# Patient Record
Sex: MALE | Race: WHITE | NOT HISPANIC OR LATINO | Employment: FULL TIME | URBAN - METROPOLITAN AREA
[De-identification: names, ages, dates, MRNs, and addresses within clinical notes are randomized per-mention and may not be internally consistent; named-entity substitution may affect disease eponyms.]

---

## 2018-02-07 ENCOUNTER — OFFICE VISIT (OUTPATIENT)
Dept: INTERNAL MEDICINE | Facility: CLINIC | Age: 33
End: 2018-02-07

## 2018-02-07 VITALS
SYSTOLIC BLOOD PRESSURE: 158 MMHG | BODY MASS INDEX: 26.63 KG/M2 | TEMPERATURE: 98.3 F | OXYGEN SATURATION: 98 % | WEIGHT: 196.6 LBS | HEART RATE: 69 BPM | HEIGHT: 72 IN | DIASTOLIC BLOOD PRESSURE: 70 MMHG

## 2018-02-07 DIAGNOSIS — J01.10 ACUTE FRONTAL SINUSITIS, RECURRENCE NOT SPECIFIED: ICD-10-CM

## 2018-02-07 DIAGNOSIS — R31.0 GROSS HEMATURIA: Primary | ICD-10-CM

## 2018-02-07 LAB
ALBUMIN SERPL-MCNC: 4.4 G/DL (ref 3.5–5.2)
ALBUMIN/GLOB SERPL: 2.3 G/DL
ALP SERPL-CCNC: 82 U/L (ref 39–117)
ALT SERPL-CCNC: 17 U/L (ref 1–41)
AST SERPL-CCNC: 13 U/L (ref 1–40)
BASOPHILS # BLD AUTO: 0.06 10*3/MM3 (ref 0–0.2)
BASOPHILS NFR BLD AUTO: 0.5 % (ref 0–1.5)
BILIRUB BLD-MCNC: NEGATIVE MG/DL
BILIRUB SERPL-MCNC: 0.3 MG/DL (ref 0.1–1.2)
BUN SERPL-MCNC: 11 MG/DL (ref 6–20)
BUN/CREAT SERPL: 10.7 (ref 7–25)
CALCIUM SERPL-MCNC: 9.1 MG/DL (ref 8.6–10.5)
CHLORIDE SERPL-SCNC: 102 MMOL/L (ref 98–107)
CLARITY, POC: CLEAR
CO2 SERPL-SCNC: 26.8 MMOL/L (ref 22–29)
COLOR UR: YELLOW
CREAT SERPL-MCNC: 1.03 MG/DL (ref 0.76–1.27)
EOSINOPHIL # BLD AUTO: 0.95 10*3/MM3 (ref 0–0.7)
EOSINOPHIL NFR BLD AUTO: 7.2 % (ref 0.3–6.2)
ERYTHROCYTE [DISTWIDTH] IN BLOOD BY AUTOMATED COUNT: 13.2 % (ref 11.5–14.5)
GFR SERPLBLD CREATININE-BSD FMLA CKD-EPI: 100 ML/MIN/1.73
GFR SERPLBLD CREATININE-BSD FMLA CKD-EPI: 83 ML/MIN/1.73
GLOBULIN SER CALC-MCNC: 1.9 GM/DL
GLUCOSE SERPL-MCNC: 98 MG/DL (ref 65–99)
GLUCOSE UR STRIP-MCNC: NEGATIVE MG/DL
HCT VFR BLD AUTO: 44.7 % (ref 40.4–52.2)
HGB BLD-MCNC: 15 G/DL (ref 13.7–17.6)
IMM GRANULOCYTES # BLD: 0.04 10*3/MM3 (ref 0–0.03)
IMM GRANULOCYTES NFR BLD: 0.3 % (ref 0–0.5)
KETONES UR QL: NEGATIVE
LEUKOCYTE EST, POC: NEGATIVE
LYMPHOCYTES # BLD AUTO: 2.58 10*3/MM3 (ref 0.9–4.8)
LYMPHOCYTES NFR BLD AUTO: 19.6 % (ref 19.6–45.3)
MCH RBC QN AUTO: 30.4 PG (ref 27–32.7)
MCHC RBC AUTO-ENTMCNC: 33.6 G/DL (ref 32.6–36.4)
MCV RBC AUTO: 90.5 FL (ref 79.8–96.2)
MONOCYTES # BLD AUTO: 1.04 10*3/MM3 (ref 0.2–1.2)
MONOCYTES NFR BLD AUTO: 7.9 % (ref 5–12)
NEUTROPHILS # BLD AUTO: 8.47 10*3/MM3 (ref 1.9–8.1)
NEUTROPHILS NFR BLD AUTO: 64.5 % (ref 42.7–76)
NITRITE UR-MCNC: NEGATIVE MG/ML
PH UR: 6 [PH] (ref 5–8)
PLATELET # BLD AUTO: 265 10*3/MM3 (ref 140–500)
POTASSIUM SERPL-SCNC: 4.2 MMOL/L (ref 3.5–5.2)
PROT SERPL-MCNC: 6.3 G/DL (ref 6–8.5)
PROT UR STRIP-MCNC: NEGATIVE MG/DL
RBC # BLD AUTO: 4.94 10*6/MM3 (ref 4.6–6)
RBC # UR STRIP: NEGATIVE /UL
SODIUM SERPL-SCNC: 142 MMOL/L (ref 136–145)
SP GR UR: 1.02 (ref 1–1.03)
UROBILINOGEN UR QL: NORMAL
WBC # BLD AUTO: 13.14 10*3/MM3 (ref 4.5–10.7)

## 2018-02-07 PROCEDURE — 99214 OFFICE O/P EST MOD 30 MIN: CPT | Performed by: FAMILY MEDICINE

## 2018-02-07 PROCEDURE — 81003 URINALYSIS AUTO W/O SCOPE: CPT | Performed by: FAMILY MEDICINE

## 2018-02-07 RX ORDER — AMOXICILLIN 500 MG/1
500 CAPSULE ORAL 3 TIMES DAILY
Qty: 21 CAPSULE | Refills: 0 | Status: SHIPPED | OUTPATIENT
Start: 2018-02-07 | End: 2018-07-27

## 2018-02-07 NOTE — PROGRESS NOTES
Uriel Jarvis is a 32 y.o. male.      Assessment/Plan   Problem List Items Addressed This Visit        Respiratory    Acute frontal sinusitis       Genitourinary    Gross hematuria - Primary    Relevant Orders    Comprehensive Metabolic Panel    CBC & Differential    POCT urinalysis dipstick, automated (Completed)           No blood in urinalysis stop creatine,  Monitor for recurrence of blood in urine or semen  Follow up if blood recurs or has lump in testicle    Chief Complaint   Patient presents with   • Blood in Urine (clot)     once a few days ago   • Nasal Congestion     off an on since the holidays   • watery eyes   • Sore Throat     Social History   Substance Use Topics   • Smoking status: Current Every Day Smoker     Packs/day: 1.50     Types: Cigarettes   • Smokeless tobacco: Never Used   • Alcohol use Yes      Comment: 5 per month       History of Present Illness   Patient comes in because he had episode of blood in his urine one time he is not having pelvic pain has been no fever there's no increased frequency of urine has not had history of recurrent urinary urinary tract infections or STDs he realized that this happened after he had had ejaculation no subsequent episodes.  He is not feeling lumps in his testicles  He also is concerned because he's had upper respiratory symptoms for the last month she is to get him symptoms consistent with a seborrheic winter he has not had any specific fever mostly head congestion and some facial tenderness no medications used  The following portions of the patient's history were reviewed and updated as appropriate:PMHroutine: Social history , Past Medical History, Allergies, Current Medications, Active Problem List, Family History and Health Maintenance    Review of Systems   Constitutional: Negative.    HENT: Negative.    Respiratory: Negative.    Cardiovascular: Negative.    Genitourinary: Positive for hematuria. Negative for decreased urine volume, difficulty  "urinating, discharge, dysuria, enuresis, flank pain, frequency, genital sores, penile swelling, scrotal swelling, testicular pain and urgency.       Objective   Vitals:    02/07/18 1046   BP: 158/70   BP Location: Right arm   Patient Position: Sitting   Cuff Size: Large Adult   Pulse: 69   Temp: 98.3 °F (36.8 °C)   TempSrc: Oral   SpO2: 98%   Weight: 89.2 kg (196 lb 9.6 oz)   Height: 182.9 cm (72\")     Body mass index is 26.66 kg/(m^2).  Physical Exam   Constitutional: He is oriented to person, place, and time. He appears well-developed and well-nourished.  Non-toxic appearance. No distress.   HENT:   Head: Normocephalic and atraumatic. Hair is normal.   Right Ear: External ear normal. No drainage, swelling or tenderness. Tympanic membrane is retracted.   Left Ear: External ear normal. No drainage, swelling or tenderness. Tympanic membrane is retracted.   Nose: Mucosal edema present. No epistaxis. Right sinus exhibits maxillary sinus tenderness and frontal sinus tenderness. Left sinus exhibits maxillary sinus tenderness and frontal sinus tenderness.   Mouth/Throat: Uvula is midline and mucous membranes are normal. No oral lesions. No uvula swelling. Posterior oropharyngeal erythema present. No oropharyngeal exudate.   Eyes: Conjunctivae and EOM are normal. Pupils are equal, round, and reactive to light. Right eye exhibits no discharge. Left eye exhibits no discharge. No scleral icterus.   Neck: Normal range of motion. Neck supple.   Cardiovascular: Normal rate, regular rhythm and normal heart sounds.  Exam reveals no gallop.    No murmur heard.  Pulmonary/Chest: Effort normal and breath sounds normal. No stridor. No respiratory distress. He has no wheezes. He has no rales. He exhibits no tenderness.   Abdominal: Soft. There is no tenderness.   Lymphadenopathy:     He has cervical adenopathy.   Neurological: He is alert and oriented to person, place, and time. He exhibits normal muscle tone.   Skin: Skin is warm and " dry. No rash noted. He is not diaphoretic.   Psychiatric: He has a normal mood and affect. His behavior is normal. Judgment and thought content normal.   Nursing note and vitals reviewed.    Reviewed Data:  Office Visit on 02/07/2018   Component Date Value Ref Range Status   • Color 02/07/2018 Yellow  Yellow, Straw, Dark Yellow,  Final   • Clarity, UA 02/07/2018 Clear  Clear Final   • Glucose, UA 02/07/2018 Negative  Negative, 1000 mg/dL (3+) mg/dL Final   • Bilirubin 02/07/2018 Negative  Negative Final   • Ketones, UA 02/07/2018 Negative  Negative Final   • Specific Gravity  02/07/2018 1.025  1.005 - 1.030 Final   • Blood, UA 02/07/2018 Negative  Negative Final   • pH, Urine 02/07/2018 6.0  5.0 - 8.0 Final   • Protein, POC 02/07/2018 Negative  Negative mg/dL Final   • Urobilinogen, UA 02/07/2018 Normal  Normal Final   • Leukocytes 02/07/2018 Negative  Negative Final   • Nitrite, UA 02/07/2018 Negative  Negative Final

## 2018-02-12 ENCOUNTER — TELEPHONE (OUTPATIENT)
Dept: INTERNAL MEDICINE | Facility: CLINIC | Age: 33
End: 2018-02-12

## 2018-02-12 NOTE — TELEPHONE ENCOUNTER
----- Message from Jose Carlos Sewell MD sent at 2/9/2018  3:38 PM EST -----  His CBC doesn't show any specific anemia however he has elevated white blood cell count without any obvious infection recommend repeating CBC in one month

## 2018-07-27 ENCOUNTER — OFFICE VISIT (OUTPATIENT)
Dept: INTERNAL MEDICINE | Facility: CLINIC | Age: 33
End: 2018-07-27

## 2018-07-27 VITALS
SYSTOLIC BLOOD PRESSURE: 144 MMHG | WEIGHT: 177.7 LBS | HEART RATE: 71 BPM | DIASTOLIC BLOOD PRESSURE: 84 MMHG | OXYGEN SATURATION: 98 % | TEMPERATURE: 98.4 F | HEIGHT: 72 IN | BODY MASS INDEX: 24.07 KG/M2

## 2018-07-27 DIAGNOSIS — F52.4 PREMATURE EJACULATION: ICD-10-CM

## 2018-07-27 DIAGNOSIS — D72.829 LEUKOCYTOSIS, UNSPECIFIED TYPE: ICD-10-CM

## 2018-07-27 DIAGNOSIS — R63.4 WEIGHT LOSS: Primary | ICD-10-CM

## 2018-07-27 PROBLEM — R31.0 GROSS HEMATURIA: Status: RESOLVED | Noted: 2018-02-07 | Resolved: 2018-07-27

## 2018-07-27 PROCEDURE — 99214 OFFICE O/P EST MOD 30 MIN: CPT | Performed by: FAMILY MEDICINE

## 2018-07-27 RX ORDER — PAROXETINE HYDROCHLORIDE 20 MG/1
20 TABLET, FILM COATED ORAL EVERY MORNING
Qty: 30 TABLET | Refills: 6 | Status: SHIPPED | OUTPATIENT
Start: 2018-07-27 | End: 2019-04-16 | Stop reason: SDUPTHER

## 2018-07-27 NOTE — PROGRESS NOTES
Uriel Jarvis is a 33 y.o. male.      Assessment/Plan   Problem List Items Addressed This Visit        Immune and Lymphatic    Leukocytosis       Genitourinary    Premature ejaculation       Other    Weight loss - Primary    Relevant Orders    TSH    T4, Free    CBC & Differential    Comprehensive Metabolic Panel    Hemoglobin A1c           Follow up results of blood work and benefit of Paxil for premature ejaculation otherwise as needed or in one month      Chief Complaint   Patient presents with   • premature ejactulation   • Weight Loss     Social History   Substance Use Topics   • Smoking status: Current Every Day Smoker     Packs/day: 1.50     Types: Electronic Cigarette, Cigarettes   • Smokeless tobacco: Never Used   • Alcohol use Yes      Comment: 5 per month       History of Present Illness   Patient appointment for chronic problems of premature ejaculation he has close to immediate check relation with penetration is been long-standing is on Zoloft the past worked well for him and he just stopped taking it because staff physician retired he has no more blood in his urine but is concerned because developed some weight loss over the last 6 months, 19 pounds is not specifically losing weight although he has been eaten today and it's 3:00 in the afternoon he works as a  country [employer] there is no fever no chills no lumps no specific change in his diet is more active and doesn't really feel hungry much.  History of leukocytosis without follow-up in one performed today  The following portions of the patient's history were reviewed and updated as appropriate:PMHroutine: Social history , Past Medical History, Surgical history , Allergies, Current Medications, Active Problem List and Health Maintenance    Review of Systems   Constitutional: Positive for unexpected weight change.   HENT: Negative.    Respiratory: Negative.    Cardiovascular: Negative.    Gastrointestinal: Negative.    Genitourinary:  "Negative.    Musculoskeletal: Negative.    Skin: Negative.    Hematological: Negative.    Psychiatric/Behavioral: Negative.        Objective   Vitals:    07/27/18 1413   BP: 144/84   BP Location: Left arm   Patient Position: Sitting   Cuff Size: Large Adult   Pulse: 71   Temp: 98.4 °F (36.9 °C)   TempSrc: Oral   SpO2: 98%   Weight: 80.6 kg (177 lb 11.2 oz)   Height: 182.9 cm (72\")     Body mass index is 24.1 kg/m².  Physical Exam   Constitutional: He is oriented to person, place, and time. He appears well-developed and well-nourished. No distress.   HENT:   Head: Normocephalic and atraumatic.   Eyes: Pupils are equal, round, and reactive to light. Conjunctivae and EOM are normal. Right eye exhibits no discharge. Left eye exhibits no discharge. No scleral icterus.   Neck: Normal range of motion. Neck supple. No tracheal deviation present. No thyromegaly present.   Cardiovascular: Normal rate, regular rhythm, normal heart sounds, intact distal pulses and normal pulses.  Exam reveals no gallop.    No murmur heard.  Pulmonary/Chest: Effort normal and breath sounds normal. No respiratory distress. He has no wheezes. He has no rales.   Abdominal: Soft. Bowel sounds are normal. He exhibits no distension. There is no tenderness. There is no rebound and no guarding. A hernia is present.   umbilical   Musculoskeletal: Normal range of motion.   Lymphadenopathy:        Head (right side): No tonsillar, no preauricular and no posterior auricular adenopathy present.        Head (left side): No tonsillar, no preauricular and no posterior auricular adenopathy present.     He has no cervical adenopathy.     He has no axillary adenopathy.        Right: No inguinal and no supraclavicular adenopathy present.        Left: No inguinal and no supraclavicular adenopathy present.   Neurological: He is alert and oriented to person, place, and time. He exhibits normal muscle tone. Coordination normal.   Skin: Skin is warm. No rash noted. No " erythema. No pallor.   Psychiatric: He has a normal mood and affect. His behavior is normal. Judgment and thought content normal.   Nursing note and vitals reviewed.    Reviewed Data:  No visits with results within 1 Month(s) from this visit.   Latest known visit with results is:   Office Visit on 02/07/2018   Component Date Value Ref Range Status   • Glucose 02/07/2018 98  65 - 99 mg/dL Final   • BUN 02/07/2018 11  6 - 20 mg/dL Final   • Creatinine 02/07/2018 1.03  0.76 - 1.27 mg/dL Final   • eGFR Non  Am 02/07/2018 83  >60 mL/min/1.73 Final   • eGFR African Am 02/07/2018 100  >60 mL/min/1.73 Final   • BUN/Creatinine Ratio 02/07/2018 10.7  7.0 - 25.0 Final   • Sodium 02/07/2018 142  136 - 145 mmol/L Final   • Potassium 02/07/2018 4.2  3.5 - 5.2 mmol/L Final   • Chloride 02/07/2018 102  98 - 107 mmol/L Final   • Total CO2 02/07/2018 26.8  22.0 - 29.0 mmol/L Final   • Calcium 02/07/2018 9.1  8.6 - 10.5 mg/dL Final   • Total Protein 02/07/2018 6.3  6.0 - 8.5 g/dL Final   • Albumin 02/07/2018 4.40  3.50 - 5.20 g/dL Final   • Globulin 02/07/2018 1.9  gm/dL Final   • A/G Ratio 02/07/2018 2.3  g/dL Final   • Total Bilirubin 02/07/2018 0.3  0.1 - 1.2 mg/dL Final   • Alkaline Phosphatase 02/07/2018 82  39 - 117 U/L Final   • AST (SGOT) 02/07/2018 13  1 - 40 U/L Final   • ALT (SGPT) 02/07/2018 17  1 - 41 U/L Final   • WBC 02/07/2018 13.14* 4.50 - 10.70 10*3/mm3 Final   • RBC 02/07/2018 4.94  4.60 - 6.00 10*6/mm3 Final   • Hemoglobin 02/07/2018 15.0  13.7 - 17.6 g/dL Final   • Hematocrit 02/07/2018 44.7  40.4 - 52.2 % Final   • MCV 02/07/2018 90.5  79.8 - 96.2 fL Final   • MCH 02/07/2018 30.4  27.0 - 32.7 pg Final   • MCHC 02/07/2018 33.6  32.6 - 36.4 g/dL Final   • RDW 02/07/2018 13.2  11.5 - 14.5 % Final   • Platelets 02/07/2018 265  140 - 500 10*3/mm3 Final   • Neutrophil Rel % 02/07/2018 64.5  42.7 - 76.0 % Final   • Lymphocyte Rel % 02/07/2018 19.6  19.6 - 45.3 % Final   • Monocyte Rel % 02/07/2018 7.9  5.0 - 12.0  % Final   • Eosinophil Rel % 02/07/2018 7.2* 0.3 - 6.2 % Final   • Basophil Rel % 02/07/2018 0.5  0.0 - 1.5 % Final   • Neutrophils Absolute 02/07/2018 8.47* 1.90 - 8.10 10*3/mm3 Final   • Lymphocytes Absolute 02/07/2018 2.58  0.90 - 4.80 10*3/mm3 Final   • Monocytes Absolute 02/07/2018 1.04  0.20 - 1.20 10*3/mm3 Final   • Eosinophils Absolute 02/07/2018 0.95* 0.00 - 0.70 10*3/mm3 Final   • Basophils Absolute 02/07/2018 0.06  0.00 - 0.20 10*3/mm3 Final   • Immature Granulocyte Rel % 02/07/2018 0.3  0.0 - 0.5 % Final   • Immature Grans Absolute 02/07/2018 0.04* 0.00 - 0.03 10*3/mm3 Final   • Color 02/07/2018 Yellow  Yellow, Straw, Dark Yellow,  Final   • Clarity, UA 02/07/2018 Clear  Clear Final   • Glucose, UA 02/07/2018 Negative  Negative, 1000 mg/dL (3+) mg/dL Final   • Bilirubin 02/07/2018 Negative  Negative Final   • Ketones, UA 02/07/2018 Negative  Negative Final   • Specific Gravity  02/07/2018 1.025  1.005 - 1.030 Final   • Blood, UA 02/07/2018 Negative  Negative Final   • pH, Urine 02/07/2018 6.0  5.0 - 8.0 Final   • Protein, POC 02/07/2018 Negative  Negative mg/dL Final   • Urobilinogen, UA 02/07/2018 Normal  Normal Final   • Leukocytes 02/07/2018 Negative  Negative Final   • Nitrite, UA 02/07/2018 Negative  Negative Final

## 2018-07-28 LAB
ALBUMIN SERPL-MCNC: 4.9 G/DL (ref 3.5–5.2)
ALBUMIN/GLOB SERPL: 2.5 G/DL
ALP SERPL-CCNC: 73 U/L (ref 39–117)
ALT SERPL-CCNC: 22 U/L (ref 1–41)
AST SERPL-CCNC: 15 U/L (ref 1–40)
BASOPHILS # BLD AUTO: 0.05 10*3/MM3 (ref 0–0.2)
BASOPHILS NFR BLD AUTO: 0.7 % (ref 0–1.5)
BILIRUB SERPL-MCNC: 0.5 MG/DL (ref 0.1–1.2)
BUN SERPL-MCNC: 14 MG/DL (ref 6–20)
BUN/CREAT SERPL: 11.8 (ref 7–25)
CALCIUM SERPL-MCNC: 8.9 MG/DL (ref 8.6–10.5)
CHLORIDE SERPL-SCNC: 101 MMOL/L (ref 98–107)
CO2 SERPL-SCNC: 24.8 MMOL/L (ref 22–29)
CREAT SERPL-MCNC: 1.19 MG/DL (ref 0.76–1.27)
EOSINOPHIL # BLD AUTO: 0.8 10*3/MM3 (ref 0–0.7)
EOSINOPHIL NFR BLD AUTO: 11.5 % (ref 0.3–6.2)
ERYTHROCYTE [DISTWIDTH] IN BLOOD BY AUTOMATED COUNT: 13.6 % (ref 11.5–14.5)
GLOBULIN SER CALC-MCNC: 2 GM/DL
GLUCOSE SERPL-MCNC: 98 MG/DL (ref 65–99)
HBA1C MFR BLD: 5.23 % (ref 4.8–5.6)
HCT VFR BLD AUTO: 45.6 % (ref 40.4–52.2)
HGB BLD-MCNC: 15 G/DL (ref 13.7–17.6)
IMM GRANULOCYTES # BLD: 0.01 10*3/MM3 (ref 0–0.03)
IMM GRANULOCYTES NFR BLD: 0.1 % (ref 0–0.5)
LYMPHOCYTES # BLD AUTO: 1.89 10*3/MM3 (ref 0.9–4.8)
LYMPHOCYTES NFR BLD AUTO: 27.2 % (ref 19.6–45.3)
MCH RBC QN AUTO: 30.2 PG (ref 27–32.7)
MCHC RBC AUTO-ENTMCNC: 32.9 G/DL (ref 32.6–36.4)
MCV RBC AUTO: 91.9 FL (ref 79.8–96.2)
MONOCYTES # BLD AUTO: 0.61 10*3/MM3 (ref 0.2–1.2)
MONOCYTES NFR BLD AUTO: 8.8 % (ref 5–12)
NEUTROPHILS # BLD AUTO: 3.59 10*3/MM3 (ref 1.9–8.1)
NEUTROPHILS NFR BLD AUTO: 51.8 % (ref 42.7–76)
PLATELET # BLD AUTO: 266 10*3/MM3 (ref 140–500)
POTASSIUM SERPL-SCNC: 4.3 MMOL/L (ref 3.5–5.2)
PROT SERPL-MCNC: 6.9 G/DL (ref 6–8.5)
RBC # BLD AUTO: 4.96 10*6/MM3 (ref 4.6–6)
SODIUM SERPL-SCNC: 141 MMOL/L (ref 136–145)
T4 FREE SERPL-MCNC: 1.41 NG/DL (ref 0.93–1.7)
TSH SERPL DL<=0.005 MIU/L-ACNC: 1.29 MIU/ML (ref 0.27–4.2)
WBC # BLD AUTO: 6.94 10*3/MM3 (ref 4.5–10.7)

## 2018-07-30 ENCOUNTER — TELEPHONE (OUTPATIENT)
Dept: INTERNAL MEDICINE | Facility: CLINIC | Age: 33
End: 2018-07-30

## 2019-04-15 RX ORDER — PAROXETINE HYDROCHLORIDE 20 MG/1
TABLET, FILM COATED ORAL
Qty: 30 TABLET | Refills: 5 | OUTPATIENT
Start: 2019-04-15

## 2019-04-16 ENCOUNTER — TELEPHONE (OUTPATIENT)
Dept: INTERNAL MEDICINE | Facility: CLINIC | Age: 34
End: 2019-04-16

## 2019-04-16 RX ORDER — PAROXETINE HYDROCHLORIDE 20 MG/1
20 TABLET, FILM COATED ORAL EVERY MORNING
Qty: 30 TABLET | Refills: 0 | Status: SHIPPED | OUTPATIENT
Start: 2019-04-16 | End: 2019-05-15 | Stop reason: SDUPTHER

## 2019-04-16 NOTE — TELEPHONE ENCOUNTER
Patient called stating that his citalopram refill was denied.  H was to return for an appointment 1 month from his last visit which was on 7/27/18.  Patient has an appointment scheduled for 5/2/19 and wanted to know if he could have this refilled until seen.  Please advise.

## 2019-05-15 ENCOUNTER — OFFICE VISIT (OUTPATIENT)
Dept: INTERNAL MEDICINE | Facility: CLINIC | Age: 34
End: 2019-05-15

## 2019-05-15 VITALS
TEMPERATURE: 98.3 F | SYSTOLIC BLOOD PRESSURE: 134 MMHG | OXYGEN SATURATION: 98 % | BODY MASS INDEX: 25.74 KG/M2 | HEART RATE: 73 BPM | DIASTOLIC BLOOD PRESSURE: 76 MMHG | WEIGHT: 189.8 LBS

## 2019-05-15 DIAGNOSIS — F52.4 PREMATURE EJACULATION: Primary | ICD-10-CM

## 2019-05-15 DIAGNOSIS — Z00.00 HEALTHCARE MAINTENANCE: ICD-10-CM

## 2019-05-15 PROBLEM — R63.4 WEIGHT LOSS: Status: RESOLVED | Noted: 2018-07-27 | Resolved: 2019-05-15

## 2019-05-15 PROCEDURE — 99213 OFFICE O/P EST LOW 20 MIN: CPT | Performed by: FAMILY MEDICINE

## 2019-05-15 RX ORDER — AMOXICILLIN 500 MG/1
CAPSULE ORAL
COMMUNITY
Start: 2019-04-02 | End: 2019-05-15

## 2019-05-15 RX ORDER — PAROXETINE HYDROCHLORIDE 20 MG/1
20 TABLET, FILM COATED ORAL EVERY MORNING
Qty: 30 TABLET | Refills: 11 | Status: SHIPPED | OUTPATIENT
Start: 2019-05-15 | End: 2020-05-18 | Stop reason: SDUPTHER

## 2019-05-15 NOTE — PROGRESS NOTES
Uriel Jarvis is a 34 y.o. male.      Assessment/Plan   Problem List Items Addressed This Visit        Genitourinary    Premature ejaculation - Primary       Other    Healthcare maintenance    Relevant Orders    Lipid Panel           Follow-up results of lipid panel for cardiac risk reduction continue Paxil for official treatment of premature ejaculation  Return in about 1 year (around 5/15/2020) for Next scheduled follow up, Recheck.      Chief Complaint   Patient presents with   • follow up to premature ejaculation   • sinus congestion     Social History     Tobacco Use   • Smoking status: Current Every Day Smoker     Packs/day: 1.50     Types: Electronic Cigarette, Cigarettes   • Smokeless tobacco: Never Used   Substance Use Topics   • Alcohol use: Yes     Comment: 5 per month   • Drug use: Not on file       History of Present Illness   Patient follows up for chronic problems of premature ejaculation doing well with fluoxetine he like to continue the same he has no chest pain shortness of breath or increased fatigue's father's recent diagnosis with coronary disease and has had bypass surgery  The following portions of the patient's history were reviewed and updated as appropriate:PMHroutine: Social history , Allergies, Current Medications, Active Problem List and Health Maintenance    Review of Systems   Constitutional: Negative.    HENT: Negative.    Respiratory: Negative.    Genitourinary: Negative.    Psychiatric/Behavioral: Negative.        Objective   Vitals:    05/15/19 1036   BP: 134/76   BP Location: Left arm   Patient Position: Sitting   Cuff Size: Large Adult   Pulse: 73   Temp: 98.3 °F (36.8 °C)   TempSrc: Oral   SpO2: 98%   Weight: 86.1 kg (189 lb 12.8 oz)     Body mass index is 25.74 kg/m².  Physical Exam   Constitutional: He is oriented to person, place, and time. He appears well-developed and well-nourished.   Cardiovascular: Normal rate.   Musculoskeletal: Normal range of motion.   Neurological: He  is alert and oriented to person, place, and time.   Psychiatric: He has a normal mood and affect. His behavior is normal. Judgment and thought content normal.   Nursing note and vitals reviewed.    Reviewed Data:  No visits with results within 1 Month(s) from this visit.   Latest known visit with results is:   Office Visit on 07/27/2018   Component Date Value Ref Range Status   • TSH 07/27/2018 1.290  0.270 - 4.200 mIU/mL Final   • Free T4 07/27/2018 1.41  0.93 - 1.70 ng/dL Final   • WBC 07/27/2018 6.94  4.50 - 10.70 10*3/mm3 Final   • RBC 07/27/2018 4.96  4.60 - 6.00 10*6/mm3 Final   • Hemoglobin 07/27/2018 15.0  13.7 - 17.6 g/dL Final   • Hematocrit 07/27/2018 45.6  40.4 - 52.2 % Final   • MCV 07/27/2018 91.9  79.8 - 96.2 fL Final   • MCH 07/27/2018 30.2  27.0 - 32.7 pg Final   • MCHC 07/27/2018 32.9  32.6 - 36.4 g/dL Final   • RDW 07/27/2018 13.6  11.5 - 14.5 % Final   • Platelets 07/27/2018 266  140 - 500 10*3/mm3 Final   • Neutrophil Rel % 07/27/2018 51.8  42.7 - 76.0 % Final   • Lymphocyte Rel % 07/27/2018 27.2  19.6 - 45.3 % Final   • Monocyte Rel % 07/27/2018 8.8  5.0 - 12.0 % Final   • Eosinophil Rel % 07/27/2018 11.5* 0.3 - 6.2 % Final   • Basophil Rel % 07/27/2018 0.7  0.0 - 1.5 % Final   • Neutrophils Absolute 07/27/2018 3.59  1.90 - 8.10 10*3/mm3 Final   • Lymphocytes Absolute 07/27/2018 1.89  0.90 - 4.80 10*3/mm3 Final   • Monocytes Absolute 07/27/2018 0.61  0.20 - 1.20 10*3/mm3 Final   • Eosinophils Absolute 07/27/2018 0.80* 0.00 - 0.70 10*3/mm3 Final   • Basophils Absolute 07/27/2018 0.05  0.00 - 0.20 10*3/mm3 Final   • Immature Granulocyte Rel % 07/27/2018 0.1  0.0 - 0.5 % Final   • Immature Grans Absolute 07/27/2018 0.01  0.00 - 0.03 10*3/mm3 Final   • Glucose 07/27/2018 98  65 - 99 mg/dL Final   • BUN 07/27/2018 14  6 - 20 mg/dL Final   • Creatinine 07/27/2018 1.19  0.76 - 1.27 mg/dL Final   • eGFR Non  Am 07/27/2018 70  >60 mL/min/1.73 Final   • eGFR African Am 07/27/2018 84  >60  mL/min/1.73 Final   • BUN/Creatinine Ratio 07/27/2018 11.8  7.0 - 25.0 Final   • Sodium 07/27/2018 141  136 - 145 mmol/L Final   • Potassium 07/27/2018 4.3  3.5 - 5.2 mmol/L Final   • Chloride 07/27/2018 101  98 - 107 mmol/L Final   • Total CO2 07/27/2018 24.8  22.0 - 29.0 mmol/L Final   • Calcium 07/27/2018 8.9  8.6 - 10.5 mg/dL Final   • Total Protein 07/27/2018 6.9  6.0 - 8.5 g/dL Final   • Albumin 07/27/2018 4.90  3.50 - 5.20 g/dL Final   • Globulin 07/27/2018 2.0  gm/dL Final   • A/G Ratio 07/27/2018 2.5  g/dL Final   • Total Bilirubin 07/27/2018 0.5  0.1 - 1.2 mg/dL Final   • Alkaline Phosphatase 07/27/2018 73  39 - 117 U/L Final   • AST (SGOT) 07/27/2018 15  1 - 40 U/L Final   • ALT (SGPT) 07/27/2018 22  1 - 41 U/L Final   • Hemoglobin A1C 07/27/2018 5.23  4.80 - 5.60 % Final    Comment: Hemoglobin A1C Ranges:  Increased Risk for Diabetes  5.7% to 6.4%  Diabetes                     >= 6.5%  Diabetic Goal                < 7.0%

## 2019-05-16 LAB
CHOLEST SERPL-MCNC: 209 MG/DL (ref 0–200)
HDLC SERPL-MCNC: 60 MG/DL (ref 40–60)
LDLC SERPL CALC-MCNC: 125 MG/DL (ref 0–100)
TRIGL SERPL-MCNC: 122 MG/DL (ref 0–150)
VLDLC SERPL CALC-MCNC: 24.4 MG/DL

## 2019-06-13 ENCOUNTER — OFFICE VISIT (OUTPATIENT)
Dept: INTERNAL MEDICINE | Facility: CLINIC | Age: 34
End: 2019-06-13

## 2019-06-13 VITALS
SYSTOLIC BLOOD PRESSURE: 134 MMHG | DIASTOLIC BLOOD PRESSURE: 88 MMHG | TEMPERATURE: 98.6 F | BODY MASS INDEX: 25.36 KG/M2 | WEIGHT: 187 LBS | HEART RATE: 74 BPM | OXYGEN SATURATION: 98 %

## 2019-06-13 DIAGNOSIS — R10.10 PAIN LOCALIZED TO UPPER ABDOMEN: Primary | ICD-10-CM

## 2019-06-13 PROCEDURE — 99214 OFFICE O/P EST MOD 30 MIN: CPT | Performed by: FAMILY MEDICINE

## 2019-06-13 NOTE — PROGRESS NOTES
Uriel Jarvis is a 34 y.o. male.      Assessment/Plan   Problem List Items Addressed This Visit        Nervous and Auditory    Pain localized to upper abdomen - Primary    Relevant Orders    CBC & Differential    Comprehensive Metabolic Panel    Lipase    Amylase    US abdomen limited           New problem with work-up follow-up results of labs and radiology testing  Return for Recheck, Next scheduled follow up.      Chief Complaint   Patient presents with   • diarrhea for 3 days   • pain in gallbladder area     Social History     Tobacco Use   • Smoking status: Current Every Day Smoker     Packs/day: 1.50     Types: Electronic Cigarette, Cigarettes   • Smokeless tobacco: Never Used   Substance Use Topics   • Alcohol use: Yes     Comment: 5 per month   • Drug use: Not on file       History of Present Illness   Patient follows up for intermittent episodes of diarrhea and abdominal pain presently asymptomatic history of right upper quadrant abdominal pain no fever no sweats no chills no vomiting intermittently related to meals no significant family history of gallbladder disease although he has had a episode requiring emergency room visit in the past  The following portions of the patient's history were reviewed and updated as appropriate:PMHroutine: Social history , Allergies, Current Medications, Active Problem List and Health Maintenance    Review of Systems   Constitutional: Negative.    Eyes: Negative.    Respiratory: Negative.    Cardiovascular: Negative.    Genitourinary: Negative.    Hematological: Negative.        Objective   Vitals:    06/13/19 1137   BP: 134/88   BP Location: Right arm   Patient Position: Sitting   Cuff Size: Adult   Pulse: 74   Temp: 98.6 °F (37 °C)   TempSrc: Oral   SpO2: 98%   Weight: 84.8 kg (187 lb)     Body mass index is 25.36 kg/m².  Physical Exam   Constitutional: He appears well-developed and well-nourished.   HENT:   Head: Normocephalic and atraumatic.   Eyes: Conjunctivae are  normal. No scleral icterus.   Cardiovascular: Normal rate and regular rhythm.   Pulmonary/Chest: Effort normal and breath sounds normal.   Abdominal: Soft. Bowel sounds are normal. He exhibits no distension. There is no tenderness. There is no guarding.   Psychiatric: He has a normal mood and affect. His behavior is normal. Judgment and thought content normal.   Nursing note and vitals reviewed.    Reviewed Data:  Office Visit on 05/15/2019   Component Date Value Ref Range Status   • Total Cholesterol 05/15/2019 209* 0 - 200 mg/dL Final   • Triglycerides 05/15/2019 122  0 - 150 mg/dL Final   • HDL Cholesterol 05/15/2019 60  40 - 60 mg/dL Final   • VLDL Cholesterol 05/15/2019 24.4  mg/dL Final   • LDL Cholesterol  05/15/2019 125* 0 - 100 mg/dL Final

## 2019-06-14 LAB
ALBUMIN SERPL-MCNC: 4.3 G/DL (ref 3.5–5.2)
ALBUMIN/GLOB SERPL: 1.8 G/DL
ALP SERPL-CCNC: 76 U/L (ref 39–117)
ALT SERPL-CCNC: 10 U/L (ref 1–41)
AMYLASE SERPL-CCNC: 29 U/L (ref 28–100)
AST SERPL-CCNC: 10 U/L (ref 1–40)
BASOPHILS # BLD AUTO: 0.05 10*3/MM3 (ref 0–0.2)
BASOPHILS NFR BLD AUTO: 0.5 % (ref 0–1.5)
BILIRUB SERPL-MCNC: <0.2 MG/DL (ref 0.2–1.2)
BUN SERPL-MCNC: 8 MG/DL (ref 6–20)
BUN/CREAT SERPL: 7.2 (ref 7–25)
CALCIUM SERPL-MCNC: 8.1 MG/DL (ref 8.6–10.5)
CHLORIDE SERPL-SCNC: 103 MMOL/L (ref 98–107)
CO2 SERPL-SCNC: 27.2 MMOL/L (ref 22–29)
CREAT SERPL-MCNC: 1.11 MG/DL (ref 0.76–1.27)
EOSINOPHIL # BLD AUTO: 0.63 10*3/MM3 (ref 0–0.4)
EOSINOPHIL NFR BLD AUTO: 6.5 % (ref 0.3–6.2)
ERYTHROCYTE [DISTWIDTH] IN BLOOD BY AUTOMATED COUNT: 13.2 % (ref 12.3–15.4)
GLOBULIN SER CALC-MCNC: 2.4 GM/DL
GLUCOSE SERPL-MCNC: 104 MG/DL (ref 65–99)
HCT VFR BLD AUTO: 45.9 % (ref 37.5–51)
HGB BLD-MCNC: 14.5 G/DL (ref 13–17.7)
IMM GRANULOCYTES # BLD AUTO: 0.02 10*3/MM3 (ref 0–0.05)
IMM GRANULOCYTES NFR BLD AUTO: 0.2 % (ref 0–0.5)
LIPASE SERPL-CCNC: 17 U/L (ref 13–60)
LYMPHOCYTES # BLD AUTO: 2.1 10*3/MM3 (ref 0.7–3.1)
LYMPHOCYTES NFR BLD AUTO: 21.8 % (ref 19.6–45.3)
MCH RBC QN AUTO: 29.7 PG (ref 26.6–33)
MCHC RBC AUTO-ENTMCNC: 31.6 G/DL (ref 31.5–35.7)
MCV RBC AUTO: 93.9 FL (ref 79–97)
MONOCYTES # BLD AUTO: 1.02 10*3/MM3 (ref 0.1–0.9)
MONOCYTES NFR BLD AUTO: 10.6 % (ref 5–12)
NEUTROPHILS # BLD AUTO: 5.83 10*3/MM3 (ref 1.7–7)
NEUTROPHILS NFR BLD AUTO: 60.4 % (ref 42.7–76)
NRBC BLD AUTO-RTO: 0 /100 WBC (ref 0–0.2)
PLATELET # BLD AUTO: 229 10*3/MM3 (ref 140–450)
POTASSIUM SERPL-SCNC: 4 MMOL/L (ref 3.5–5.2)
PROT SERPL-MCNC: 6.7 G/DL (ref 6–8.5)
RBC # BLD AUTO: 4.89 10*6/MM3 (ref 4.14–5.8)
SODIUM SERPL-SCNC: 141 MMOL/L (ref 136–145)
WBC # BLD AUTO: 9.65 10*3/MM3 (ref 3.4–10.8)

## 2019-06-18 DIAGNOSIS — R79.89 LOW SERUM CALCIUM: Primary | ICD-10-CM

## 2019-06-28 ENCOUNTER — RESULTS ENCOUNTER (OUTPATIENT)
Dept: INTERNAL MEDICINE | Facility: CLINIC | Age: 34
End: 2019-06-28

## 2019-06-28 DIAGNOSIS — R79.89 LOW SERUM CALCIUM: ICD-10-CM

## 2019-07-02 ENCOUNTER — APPOINTMENT (OUTPATIENT)
Dept: ULTRASOUND IMAGING | Facility: HOSPITAL | Age: 34
End: 2019-07-02

## 2020-05-18 RX ORDER — PAROXETINE HYDROCHLORIDE 20 MG/1
20 TABLET, FILM COATED ORAL EVERY MORNING
Qty: 30 TABLET | Refills: 0 | Status: SHIPPED | OUTPATIENT
Start: 2020-05-18 | End: 2020-06-08 | Stop reason: SDUPTHER

## 2020-05-18 RX ORDER — PAROXETINE HYDROCHLORIDE 20 MG/1
TABLET, FILM COATED ORAL
Qty: 30 TABLET | Refills: 10 | OUTPATIENT
Start: 2020-05-18

## 2020-06-08 ENCOUNTER — OFFICE VISIT (OUTPATIENT)
Dept: INTERNAL MEDICINE | Facility: CLINIC | Age: 35
End: 2020-06-08

## 2020-06-08 VITALS
OXYGEN SATURATION: 98 % | HEART RATE: 73 BPM | HEIGHT: 72 IN | SYSTOLIC BLOOD PRESSURE: 152 MMHG | BODY MASS INDEX: 24.88 KG/M2 | WEIGHT: 183.7 LBS | DIASTOLIC BLOOD PRESSURE: 90 MMHG

## 2020-06-08 DIAGNOSIS — R68.82 DECREASED LIBIDO: ICD-10-CM

## 2020-06-08 DIAGNOSIS — F52.4 PREMATURE EJACULATION: Primary | ICD-10-CM

## 2020-06-08 PROCEDURE — 99213 OFFICE O/P EST LOW 20 MIN: CPT | Performed by: FAMILY MEDICINE

## 2020-06-08 RX ORDER — PAROXETINE 10 MG/1
10 TABLET, FILM COATED ORAL EVERY MORNING
Qty: 30 TABLET | Refills: 6 | Status: SHIPPED | OUTPATIENT
Start: 2020-06-08 | End: 2021-03-10 | Stop reason: SDUPTHER

## 2020-06-08 NOTE — PROGRESS NOTES
"Uriel Jarvis is a 35 y.o. male.      Assessment/Plan   Problem List Items Addressed This Visit        Genitourinary    Premature ejaculation - Primary       Other    Decreased libido    Relevant Medications    PARoxetine (PAXIL) 10 MG tablet    Other Relevant Orders    Testosterone (Free & Total), LC / MS           Decrease paroxetine 10 mg daily follow-up results of testosterone testing.  Need to stop proximal teen completely based on testosterone dosing  Return in about 1 month (around 7/8/2020), or if symptoms worsen or fail to improve.      Chief Complaint   Patient presents with   • no libido   • follow up to paxil     Social History     Tobacco Use   • Smoking status: Current Every Day Smoker     Packs/day: 1.50     Types: Electronic Cigarette, Cigarettes   • Smokeless tobacco: Never Used   Substance Use Topics   • Alcohol use: Yes     Comment: 5 per month   • Drug use: Not on file       History of Present Illness   Patient appointment to discuss premature ejaculation and decreased libido he has been using the Paxil for years and has not had any unwanted side effects but is developed some decreased sex drive over the last couple months does not know if he can attributed to COVID-19 changes in his life he has not had sex in over 3months but does not notice that there is been rectal dysfunction  The following portions of the patient's history were reviewed and updated as appropriate:PMHroutine: Social history , Allergies, Current Medications, Active Problem List and Health Maintenance    Review of Systems   Constitutional: Negative.    HENT: Negative.    Respiratory: Negative.    Genitourinary: Negative.        Objective   Vitals:    06/08/20 1501   BP: 152/90   BP Location: Right arm   Patient Position: Sitting   Cuff Size: Adult   Pulse: 73   SpO2: 98%   Weight: 83.3 kg (183 lb 11.2 oz)   Height: 182.9 cm (72\")     Body mass index is 24.91 kg/m².  Physical Exam   Constitutional: He is oriented to person, " place, and time. He appears well-developed and well-nourished.   Pulmonary/Chest: Effort normal.   Musculoskeletal: Normal range of motion.   Neurological: He is alert and oriented to person, place, and time.   Psychiatric: He has a normal mood and affect. His behavior is normal. Judgment and thought content normal.   Nursing note and vitals reviewed.    Reviewed Data:  No visits with results within 1 Month(s) from this visit.   Latest known visit with results is:   Office Visit on 06/13/2019   Component Date Value Ref Range Status   • WBC 06/13/2019 9.65  3.40 - 10.80 10*3/mm3 Final   • RBC 06/13/2019 4.89  4.14 - 5.80 10*6/mm3 Final   • Hemoglobin 06/13/2019 14.5  13.0 - 17.7 g/dL Final   • Hematocrit 06/13/2019 45.9  37.5 - 51.0 % Final   • MCV 06/13/2019 93.9  79.0 - 97.0 fL Final   • MCH 06/13/2019 29.7  26.6 - 33.0 pg Final   • MCHC 06/13/2019 31.6  31.5 - 35.7 g/dL Final   • RDW 06/13/2019 13.2  12.3 - 15.4 % Final   • Platelets 06/13/2019 229  140 - 450 10*3/mm3 Final   • Neutrophil Rel % 06/13/2019 60.4  42.7 - 76.0 % Final   • Lymphocyte Rel % 06/13/2019 21.8  19.6 - 45.3 % Final   • Monocyte Rel % 06/13/2019 10.6  5.0 - 12.0 % Final   • Eosinophil Rel % 06/13/2019 6.5* 0.3 - 6.2 % Final   • Basophil Rel % 06/13/2019 0.5  0.0 - 1.5 % Final   • Neutrophils Absolute 06/13/2019 5.83  1.70 - 7.00 10*3/mm3 Final   • Lymphocytes Absolute 06/13/2019 2.10  0.70 - 3.10 10*3/mm3 Final   • Monocytes Absolute 06/13/2019 1.02* 0.10 - 0.90 10*3/mm3 Final   • Eosinophils Absolute 06/13/2019 0.63* 0.00 - 0.40 10*3/mm3 Final   • Basophils Absolute 06/13/2019 0.05  0.00 - 0.20 10*3/mm3 Final   • Immature Granulocyte Rel % 06/13/2019 0.2  0.0 - 0.5 % Final   • Immature Grans Absolute 06/13/2019 0.02  0.00 - 0.05 10*3/mm3 Final   • nRBC 06/13/2019 0.0  0.0 - 0.2 /100 WBC Final   • Glucose 06/13/2019 104* 65 - 99 mg/dL Final   • BUN 06/13/2019 8  6 - 20 mg/dL Final   • Creatinine 06/13/2019 1.11  0.76 - 1.27 mg/dL Final   •  eGFR Non African Am 06/13/2019 75  >60 mL/min/1.73 Final   • eGFR African Am 06/13/2019 91  >60 mL/min/1.73 Final   • BUN/Creatinine Ratio 06/13/2019 7.2  7.0 - 25.0 Final   • Sodium 06/13/2019 141  136 - 145 mmol/L Final   • Potassium 06/13/2019 4.0  3.5 - 5.2 mmol/L Final   • Chloride 06/13/2019 103  98 - 107 mmol/L Final   • Total CO2 06/13/2019 27.2  22.0 - 29.0 mmol/L Final   • Calcium 06/13/2019 8.1* 8.6 - 10.5 mg/dL Final   • Total Protein 06/13/2019 6.7  6.0 - 8.5 g/dL Final   • Albumin 06/13/2019 4.30  3.50 - 5.20 g/dL Final   • Globulin 06/13/2019 2.4  gm/dL Final   • A/G Ratio 06/13/2019 1.8  g/dL Final   • Total Bilirubin 06/13/2019 <0.2* 0.2 - 1.2 mg/dL Final   • Alkaline Phosphatase 06/13/2019 76  39 - 117 U/L Final   • AST (SGOT) 06/13/2019 10  1 - 40 U/L Final   • ALT (SGPT) 06/13/2019 10  1 - 41 U/L Final   • Lipase 06/13/2019 17  13 - 60 U/L Final   • Amylase 06/13/2019 29  28 - 100 U/L Final

## 2020-08-13 ENCOUNTER — LAB (OUTPATIENT)
Dept: LAB | Facility: HOSPITAL | Age: 35
End: 2020-08-13

## 2020-08-13 ENCOUNTER — OFFICE VISIT (OUTPATIENT)
Dept: INTERNAL MEDICINE | Facility: CLINIC | Age: 35
End: 2020-08-13

## 2020-08-13 VITALS
HEIGHT: 72 IN | OXYGEN SATURATION: 99 % | HEART RATE: 71 BPM | SYSTOLIC BLOOD PRESSURE: 140 MMHG | WEIGHT: 186.8 LBS | DIASTOLIC BLOOD PRESSURE: 80 MMHG | BODY MASS INDEX: 25.3 KG/M2

## 2020-08-13 DIAGNOSIS — R68.82 DECREASED LIBIDO: Primary | ICD-10-CM

## 2020-08-13 PROBLEM — R10.10 PAIN LOCALIZED TO UPPER ABDOMEN: Status: RESOLVED | Noted: 2019-06-13 | Resolved: 2020-08-13

## 2020-08-13 LAB
ALBUMIN SERPL-MCNC: 4.5 G/DL (ref 3.5–5.2)
ALBUMIN/GLOB SERPL: 2 G/DL
ALP SERPL-CCNC: 54 U/L (ref 39–117)
ALT SERPL W P-5'-P-CCNC: 13 U/L (ref 1–41)
ANION GAP SERPL CALCULATED.3IONS-SCNC: 8.6 MMOL/L (ref 5–15)
AST SERPL-CCNC: 13 U/L (ref 1–40)
BILIRUB SERPL-MCNC: 0.4 MG/DL (ref 0–1.2)
BUN SERPL-MCNC: 13 MG/DL (ref 6–20)
BUN/CREAT SERPL: 11.9 (ref 7–25)
CALCIUM SPEC-SCNC: 8.7 MG/DL (ref 8.6–10.5)
CHLORIDE SERPL-SCNC: 101 MMOL/L (ref 98–107)
CO2 SERPL-SCNC: 25.4 MMOL/L (ref 22–29)
CREAT SERPL-MCNC: 1.09 MG/DL (ref 0.76–1.27)
GFR SERPL CREATININE-BSD FRML MDRD: 76 ML/MIN/1.73
GLOBULIN UR ELPH-MCNC: 2.2 GM/DL
GLUCOSE SERPL-MCNC: 100 MG/DL (ref 65–99)
POTASSIUM SERPL-SCNC: 4.4 MMOL/L (ref 3.5–5.2)
PROT SERPL-MCNC: 6.7 G/DL (ref 6–8.5)
SODIUM SERPL-SCNC: 135 MMOL/L (ref 136–145)
T4 FREE SERPL-MCNC: 1.3 NG/DL (ref 0.93–1.7)
TSH SERPL DL<=0.05 MIU/L-ACNC: 0.8 UIU/ML (ref 0.27–4.2)

## 2020-08-13 PROCEDURE — 84402 ASSAY OF FREE TESTOSTERONE: CPT | Performed by: FAMILY MEDICINE

## 2020-08-13 PROCEDURE — 84439 ASSAY OF FREE THYROXINE: CPT | Performed by: FAMILY MEDICINE

## 2020-08-13 PROCEDURE — 84403 ASSAY OF TOTAL TESTOSTERONE: CPT | Performed by: FAMILY MEDICINE

## 2020-08-13 PROCEDURE — 84443 ASSAY THYROID STIM HORMONE: CPT | Performed by: FAMILY MEDICINE

## 2020-08-13 PROCEDURE — 99213 OFFICE O/P EST LOW 20 MIN: CPT | Performed by: FAMILY MEDICINE

## 2020-08-13 PROCEDURE — 36415 COLL VENOUS BLD VENIPUNCTURE: CPT | Performed by: FAMILY MEDICINE

## 2020-08-13 PROCEDURE — 80053 COMPREHEN METABOLIC PANEL: CPT | Performed by: FAMILY MEDICINE

## 2020-08-13 NOTE — PROGRESS NOTES
"Uriel Jarvis is a 35 y.o. male.      Assessment/Plan   Problem List Items Addressed This Visit        Other    Decreased libido - Primary    Relevant Orders    T4, Free    TSH    Comprehensive Metabolic Panel           Follow-up results of blood work for evaluation of decreased libido   Return if symptoms worsen or fail to improve, for Recheck.      Chief Complaint   Patient presents with   • low libido     started taking paxil again 2 days ago- no change in symptoms     Social History     Tobacco Use   • Smoking status: Current Every Day Smoker     Packs/day: 1.50     Types: Electronic Cigarette, Cigarettes   • Smokeless tobacco: Never Used   Substance Use Topics   • Alcohol use: Yes     Comment: 5 per month   • Drug use: Not on file       History of Present Illness   Patient appointment follow-up for decreased libido restart his Paxil because of underlying premature ejaculation and has realized over time that its may be decreased sex drive that he is starting to experience more so than PE his mood is stable no specific anxieties or depression  The following portions of the patient's history were reviewed and updated as appropriate:PMHroutine: Social history , Allergies, Current Medications, Active Problem List and Health Maintenance    Review of Systems   Constitutional: Negative.    HENT: Negative.    Neurological: Negative.        Objective   Vitals:    08/13/20 1500   BP: 140/80   BP Location: Left arm   Patient Position: Sitting   Cuff Size: Adult   Pulse: 71   SpO2: 99%   Weight: 84.7 kg (186 lb 12.8 oz)   Height: 182.9 cm (72\")     Body mass index is 25.33 kg/m².  Physical Exam   Constitutional: He appears well-developed and well-nourished.   Cardiovascular: Normal rate.   Pulmonary/Chest: Effort normal.   Psychiatric: He has a normal mood and affect. His behavior is normal. Judgment and thought content normal.   Nursing note and vitals reviewed.    Reviewed Data:  No visits with results within 1 Month(s) " from this visit.   Latest known visit with results is:   Office Visit on 06/13/2019   Component Date Value Ref Range Status   • WBC 06/13/2019 9.65  3.40 - 10.80 10*3/mm3 Final   • RBC 06/13/2019 4.89  4.14 - 5.80 10*6/mm3 Final   • Hemoglobin 06/13/2019 14.5  13.0 - 17.7 g/dL Final   • Hematocrit 06/13/2019 45.9  37.5 - 51.0 % Final   • MCV 06/13/2019 93.9  79.0 - 97.0 fL Final   • MCH 06/13/2019 29.7  26.6 - 33.0 pg Final   • MCHC 06/13/2019 31.6  31.5 - 35.7 g/dL Final   • RDW 06/13/2019 13.2  12.3 - 15.4 % Final   • Platelets 06/13/2019 229  140 - 450 10*3/mm3 Final   • Neutrophil Rel % 06/13/2019 60.4  42.7 - 76.0 % Final   • Lymphocyte Rel % 06/13/2019 21.8  19.6 - 45.3 % Final   • Monocyte Rel % 06/13/2019 10.6  5.0 - 12.0 % Final   • Eosinophil Rel % 06/13/2019 6.5* 0.3 - 6.2 % Final   • Basophil Rel % 06/13/2019 0.5  0.0 - 1.5 % Final   • Neutrophils Absolute 06/13/2019 5.83  1.70 - 7.00 10*3/mm3 Final   • Lymphocytes Absolute 06/13/2019 2.10  0.70 - 3.10 10*3/mm3 Final   • Monocytes Absolute 06/13/2019 1.02* 0.10 - 0.90 10*3/mm3 Final   • Eosinophils Absolute 06/13/2019 0.63* 0.00 - 0.40 10*3/mm3 Final   • Basophils Absolute 06/13/2019 0.05  0.00 - 0.20 10*3/mm3 Final   • Immature Granulocyte Rel % 06/13/2019 0.2  0.0 - 0.5 % Final   • Immature Grans Absolute 06/13/2019 0.02  0.00 - 0.05 10*3/mm3 Final   • nRBC 06/13/2019 0.0  0.0 - 0.2 /100 WBC Final   • Glucose 06/13/2019 104* 65 - 99 mg/dL Final   • BUN 06/13/2019 8  6 - 20 mg/dL Final   • Creatinine 06/13/2019 1.11  0.76 - 1.27 mg/dL Final   • eGFR Non African Am 06/13/2019 75  >60 mL/min/1.73 Final   • eGFR African Am 06/13/2019 91  >60 mL/min/1.73 Final   • BUN/Creatinine Ratio 06/13/2019 7.2  7.0 - 25.0 Final   • Sodium 06/13/2019 141  136 - 145 mmol/L Final   • Potassium 06/13/2019 4.0  3.5 - 5.2 mmol/L Final   • Chloride 06/13/2019 103  98 - 107 mmol/L Final   • Total CO2 06/13/2019 27.2  22.0 - 29.0 mmol/L Final   • Calcium 06/13/2019 8.1* 8.6 -  10.5 mg/dL Final   • Total Protein 06/13/2019 6.7  6.0 - 8.5 g/dL Final   • Albumin 06/13/2019 4.30  3.50 - 5.20 g/dL Final   • Globulin 06/13/2019 2.4  gm/dL Final   • A/G Ratio 06/13/2019 1.8  g/dL Final   • Total Bilirubin 06/13/2019 <0.2* 0.2 - 1.2 mg/dL Final   • Alkaline Phosphatase 06/13/2019 76  39 - 117 U/L Final   • AST (SGOT) 06/13/2019 10  1 - 40 U/L Final   • ALT (SGPT) 06/13/2019 10  1 - 41 U/L Final   • Lipase 06/13/2019 17  13 - 60 U/L Final   • Amylase 06/13/2019 29  28 - 100 U/L Final

## 2020-08-19 LAB
TESTOST FREE SERPL-MCNC: 6.3 PG/ML (ref 8.7–25.1)
TESTOST SERPL-MCNC: 779.4 NG/DL (ref 264–916)

## 2020-08-21 DIAGNOSIS — R79.89 LOW TESTOSTERONE LEVEL IN MALE: Primary | ICD-10-CM

## 2020-09-10 ENCOUNTER — OFFICE VISIT (OUTPATIENT)
Dept: INTERNAL MEDICINE | Facility: CLINIC | Age: 35
End: 2020-09-10

## 2020-09-10 DIAGNOSIS — Z53.21 PATIENT LEFT WITHOUT BEING SEEN: Primary | ICD-10-CM

## 2020-09-10 NOTE — PROGRESS NOTES
Patient left without being seen - injury related to work.  Patient will follow up with Workman's Comp.  Also given information for [facility].  Patient expressed understanding.

## 2021-03-10 NOTE — TELEPHONE ENCOUNTER
Caller: Uriel Jarvis    Relationship: Self    Best call back number: [phone]    Medication needed:   Requested Prescriptions     Pending Prescriptions Disp Refills   • PARoxetine (PAXIL) 10 MG tablet 30 tablet 6     Sig: Take 1 tablet by mouth Every Morning.       When do you need the refill by: ASAP      Does the patient have less than a 3 day supply:  [x] Yes  [] No    What is the patient's preferred pharmacy: [facility] [address] - [phone] [facility] [phone]

## 2021-03-11 RX ORDER — PAROXETINE 10 MG/1
10 TABLET, FILM COATED ORAL EVERY MORNING
Qty: 30 TABLET | Refills: 6 | Status: SHIPPED | OUTPATIENT
Start: 2021-03-11 | End: 2022-02-10 | Stop reason: SDUPTHER

## 2021-04-16 ENCOUNTER — BULK ORDERING (OUTPATIENT)
Dept: CASE MANAGEMENT | Facility: OTHER | Age: 36
End: 2021-04-16

## 2021-04-16 DIAGNOSIS — Z23 IMMUNIZATION DUE: ICD-10-CM

## 2021-06-29 ENCOUNTER — OFFICE VISIT (OUTPATIENT)
Dept: INTERNAL MEDICINE | Facility: CLINIC | Age: 36
End: 2021-06-29

## 2021-06-29 VITALS
SYSTOLIC BLOOD PRESSURE: 156 MMHG | WEIGHT: 193.7 LBS | HEART RATE: 72 BPM | OXYGEN SATURATION: 99 % | BODY MASS INDEX: 26.24 KG/M2 | HEIGHT: 72 IN | DIASTOLIC BLOOD PRESSURE: 80 MMHG

## 2021-06-29 DIAGNOSIS — J02.9 PHARYNGITIS, UNSPECIFIED ETIOLOGY: Primary | ICD-10-CM

## 2021-06-29 PROCEDURE — 99213 OFFICE O/P EST LOW 20 MIN: CPT | Performed by: FAMILY MEDICINE

## 2021-06-29 RX ORDER — TESTOSTERONE CYPIONATE 200 MG/ML
INJECTION, SOLUTION INTRAMUSCULAR
COMMUNITY
Start: 2021-06-14

## 2021-06-29 RX ORDER — AMOXICILLIN 500 MG/1
500 CAPSULE ORAL 3 TIMES DAILY
Qty: 30 CAPSULE | Refills: 0 | Status: SHIPPED | OUTPATIENT
Start: 2021-06-29 | End: 2022-12-06

## 2021-06-29 NOTE — PROGRESS NOTES
"Chief Complaint  Sore Throat, sinus congestion, and Excessive Sweating    Subjective          Uriel Jarvis presents to [facility]  History of Present Illness  Follows up for 1 week of sore throat not really improving some head congestion as well minimal cough nonproductive  Objective   Vital Signs:   /80 (BP Location: Left arm, Patient Position: Sitting, Cuff Size: Adult)   Pulse 72   Ht 182.9 cm (72\")   Wt 87.9 kg (193 lb 11.2 oz)   SpO2 99%   BMI 26.27 kg/m²     Physical Exam  Vitals and nursing note reviewed.   Constitutional:       Appearance: Normal appearance.   HENT:      Head: Normocephalic and atraumatic.      Mouth/Throat:      Pharynx: Posterior oropharyngeal erythema present.   Cardiovascular:      Pulses: Normal pulses.      Heart sounds: Normal heart sounds.   Pulmonary:      Effort: Pulmonary effort is normal.      Breath sounds: Normal breath sounds.   Lymphadenopathy:      Cervical: Cervical adenopathy present.   Neurological:      Mental Status: He is alert.   Psychiatric:         Mood and Affect: Mood normal.         Thought Content: Thought content normal.         Judgment: Judgment normal.        Result Review :                 Assessment and Plan    Diagnoses and all orders for this visit:    1. Pharyngitis, unspecified etiology (Primary)    Other orders  -     amoxicillin (AMOXIL) 500 MG capsule; Take 1 capsule by mouth 3 (Three) Times a Day.  Dispense: 30 capsule; Refill: 0        Follow Up   Return if symptoms worsen or fail to improve, for Recheck.  Patient was given instructions and counseling regarding his condition or for health maintenance advice. Please see specific information pulled into the AVS if appropriate.       "

## 2021-08-05 ENCOUNTER — OFFICE VISIT (OUTPATIENT)
Dept: INTERNAL MEDICINE | Facility: CLINIC | Age: 36
End: 2021-08-05

## 2021-08-05 VITALS
WEIGHT: 192.7 LBS | OXYGEN SATURATION: 99 % | DIASTOLIC BLOOD PRESSURE: 84 MMHG | HEART RATE: 77 BPM | SYSTOLIC BLOOD PRESSURE: 154 MMHG | BODY MASS INDEX: 26.1 KG/M2 | HEIGHT: 72 IN

## 2021-08-05 DIAGNOSIS — M94.0 ACUTE COSTOCHONDRITIS: Primary | ICD-10-CM

## 2021-08-05 PROCEDURE — 99213 OFFICE O/P EST LOW 20 MIN: CPT | Performed by: FAMILY MEDICINE

## 2021-08-05 NOTE — PROGRESS NOTES
"Chief Complaint  Abdominal Pain    Subjective          Uriel Jarvis presents to [facility]  History of Present Illness  Patient appointment for acute problem of pain in the area between his abdomen and his chest fairly constant although gradually improving but worse with breathing started approximately 3 days ago no specific changes activity or diet  No bowel or bladder problems no vomiting no fever sweats or chills   area chest laterally right sided level of the seventh-eighth rib  Objective   Vital Signs:   /84 (BP Location: Left arm, Patient Position: Sitting, Cuff Size: Large Adult)   Pulse 77   Ht 182.9 cm (72\")   Wt 87.4 kg (192 lb 11.2 oz)   SpO2 99%   BMI 26.13 kg/m²     Physical Exam  Constitutional:       Appearance: Normal appearance.   Cardiovascular:      Rate and Rhythm: Normal rate and regular rhythm.      Pulses: Normal pulses.      Heart sounds: Normal heart sounds.   Pulmonary:      Effort: Pulmonary effort is normal.      Breath sounds: Normal breath sounds.   Chest:      Chest wall: Tenderness present.       Neurological:      Mental Status: He is alert.        Result Review :                 Assessment and Plan    Diagnoses and all orders for this visit:    1. Acute costochondritis (Primary)    Other orders  -     Cancel: Comprehensive Metabolic Panel  -     Cancel: CBC & Differential    Naproxen  2 2 0 mgrams 2 every 12 hours for 1 week then 1 every 12 hours for 1 week    Follow Up   Return if symptoms worsen or fail to improve, for Recheck.  Patient was given instructions and counseling regarding his condition or for health maintenance advice. Please see specific information pulled into the AVS if appropriate.       "

## 2021-11-22 ENCOUNTER — TELEMEDICINE (OUTPATIENT)
Dept: INTERNAL MEDICINE | Facility: CLINIC | Age: 36
End: 2021-11-22

## 2021-11-22 DIAGNOSIS — J01.00 ACUTE NON-RECURRENT MAXILLARY SINUSITIS: Primary | ICD-10-CM

## 2021-11-22 PROCEDURE — 99212 OFFICE O/P EST SF 10 MIN: CPT | Performed by: FAMILY MEDICINE

## 2021-11-22 RX ORDER — LEVOFLOXACIN 750 MG/1
750 TABLET ORAL DAILY
Qty: 10 TABLET | Refills: 0 | Status: SHIPPED | OUTPATIENT
Start: 2021-11-22 | End: 2022-12-06

## 2021-11-22 NOTE — PROGRESS NOTES
11/22/2021    Patient Information  Uriel Jarvis                                                                                          [address] [place] [address]      Chief Complaint: No chief complaint on file.         History of Present Illness:  Pt provides verbal consent for telehealth visit by phone. He endorses >5day hx of sinus congestion  + pressure, no fevers, chills. Not improving with otc meds. No sick nor known covid exposures.   No further concerns/complaints.    Review of Systems   Constitutional: Positive for chills.   HENT: Positive for congestion.    Eyes: Negative.    Cardiovascular: Negative.    Respiratory: Negative.    Endocrine: Negative.    Hematologic/Lymphatic: Negative.    Skin: Negative.    Musculoskeletal: Negative.    Gastrointestinal: Negative.    Genitourinary: Negative.    Neurological: Negative.    Psychiatric/Behavioral: Negative.    Allergic/Immunologic: Negative.        Active Problems:    Patient Active Problem List   Diagnosis   • Acute frontal sinusitis   • Premature ejaculation   • Leukocytosis   • Healthcare maintenance   • Decreased libido   • Pharyngitis   • Acute costochondritis         No past medical history on file.      Past Surgical History:   Procedure Laterality Date   • TONSILLECTOMY  1991         No Known Allergies        Current Outpatient Medications:   •  amoxicillin (AMOXIL) 500 MG capsule, Take 1 capsule by mouth 3 (Three) Times a Day., Disp: 30 capsule, Rfl: 0  •  ibuprofen (ADVIL,MOTRIN) 200 MG tablet, Take 600 mg by mouth daily as needed for mild pain (1-3)., Disp: , Rfl:   •  PARoxetine (PAXIL) 10 MG tablet, Take 1 tablet by mouth Every Morning., Disp: 30 tablet, Rfl: 6  •  Testosterone Cypionate (DEPOTESTOTERONE CYPIONATE) 200 MG/ML injection, , Disp: , Rfl:       Family History   Problem Relation Age of Onset   • Multiple sclerosis Mother    • Hypertension Father    • Diabetes Maternal Grandmother    • Stroke Paternal  Grandfather          Social History     Socioeconomic History   • Marital status:    • Number of children: 0   Tobacco Use   • Smoking status: Current Every Day Smoker     Packs/day: 1.50     Types: Electronic Cigarette, Cigarettes   • Smokeless tobacco: Never Used   Substance and Sexual Activity   • Alcohol use: Yes     Comment: 5 per month   • Sexual activity: Yes     Partners: Female         Physical Exam  Neurological:      Mental Status: He is alert and oriented to person, place, and time.   Psychiatric:         Mood and Affect: Mood normal.         Behavior: Behavior normal.         Thought Content: Thought content normal.         Judgment: Judgment normal.          There were no vitals filed for this visit.    There is no height or weight on file to calculate BMI.       Lab/other results:        Assessment/Plan:    Diagnoses and all orders for this visit:    1. Acute non-recurrent maxillary sinusitis (Primary)    levaquin, antihistamines, sinus rinsing, flonase, f/u in 1 kw if no improvement.     Total time spent was 10 minutes.      Procedures

## 2022-02-10 RX ORDER — PAROXETINE 10 MG/1
10 TABLET, FILM COATED ORAL EVERY MORNING
Qty: 30 TABLET | Refills: 2 | Status: SHIPPED | OUTPATIENT
Start: 2022-02-10 | End: 2022-08-06 | Stop reason: SDUPTHER

## 2022-08-08 RX ORDER — PAROXETINE 10 MG/1
10 TABLET, FILM COATED ORAL EVERY MORNING
Qty: 30 TABLET | Refills: 0 | Status: SHIPPED | OUTPATIENT
Start: 2022-08-08 | End: 2022-09-21

## 2022-09-21 RX ORDER — PAROXETINE 10 MG/1
TABLET, FILM COATED ORAL
Qty: 15 TABLET | Refills: 0 | Status: SHIPPED | OUTPATIENT
Start: 2022-09-21 | End: 2022-10-25 | Stop reason: SDUPTHER

## 2022-10-25 RX ORDER — PAROXETINE 10 MG/1
10 TABLET, FILM COATED ORAL EVERY MORNING
Qty: 15 TABLET | Refills: 0 | Status: SHIPPED | OUTPATIENT
Start: 2022-10-25 | End: 2022-11-28 | Stop reason: SDUPTHER

## 2022-11-28 RX ORDER — PAROXETINE 10 MG/1
10 TABLET, FILM COATED ORAL EVERY MORNING
Qty: 15 TABLET | Refills: 0 | Status: SHIPPED | OUTPATIENT
Start: 2022-11-28 | End: 2022-12-06

## 2022-12-06 ENCOUNTER — OFFICE VISIT (OUTPATIENT)
Dept: INTERNAL MEDICINE | Facility: CLINIC | Age: 37
End: 2022-12-06

## 2022-12-06 VITALS
OXYGEN SATURATION: 100 % | BODY MASS INDEX: 27.06 KG/M2 | WEIGHT: 199.8 LBS | TEMPERATURE: 97.7 F | SYSTOLIC BLOOD PRESSURE: 152 MMHG | HEIGHT: 72 IN | DIASTOLIC BLOOD PRESSURE: 72 MMHG | HEART RATE: 73 BPM

## 2022-12-06 DIAGNOSIS — F41.9 ANXIETY: ICD-10-CM

## 2022-12-06 DIAGNOSIS — R35.0 URINARY FREQUENCY: ICD-10-CM

## 2022-12-06 DIAGNOSIS — L03.011 CELLULITIS OF RIGHT MIDDLE FINGER: Primary | ICD-10-CM

## 2022-12-06 PROBLEM — B07.9 WART OF HAND: Status: ACTIVE | Noted: 2022-12-06

## 2022-12-06 PROBLEM — M94.0 ACUTE COSTOCHONDRITIS: Status: RESOLVED | Noted: 2021-08-05 | Resolved: 2022-12-06

## 2022-12-06 PROBLEM — J01.10 ACUTE FRONTAL SINUSITIS: Status: RESOLVED | Noted: 2018-02-07 | Resolved: 2022-12-06

## 2022-12-06 PROBLEM — J02.9 PHARYNGITIS: Status: RESOLVED | Noted: 2021-06-29 | Resolved: 2022-12-06

## 2022-12-06 LAB
BILIRUB BLD-MCNC: NEGATIVE MG/DL
CLARITY, POC: CLEAR
COLOR UR: YELLOW
EXPIRATION DATE: NORMAL
GLUCOSE UR STRIP-MCNC: NEGATIVE MG/DL
KETONES UR QL: NEGATIVE
LEUKOCYTE EST, POC: NEGATIVE
Lab: NORMAL
NITRITE UR-MCNC: NEGATIVE MG/ML
PH UR: 7 [PH] (ref 5–8)
PROT UR STRIP-MCNC: NEGATIVE MG/DL
RBC # UR STRIP: NEGATIVE /UL
SP GR UR: 1.01 (ref 1–1.03)
UROBILINOGEN UR QL: NORMAL

## 2022-12-06 PROCEDURE — 81003 URINALYSIS AUTO W/O SCOPE: CPT | Performed by: FAMILY MEDICINE

## 2022-12-06 PROCEDURE — 99214 OFFICE O/P EST MOD 30 MIN: CPT | Performed by: FAMILY MEDICINE

## 2022-12-06 RX ORDER — CEPHALEXIN 500 MG/1
500 CAPSULE ORAL 2 TIMES DAILY
Qty: 20 CAPSULE | Refills: 0 | Status: SHIPPED | OUTPATIENT
Start: 2022-12-06

## 2022-12-06 RX ORDER — ESCITALOPRAM OXALATE 10 MG/1
10 TABLET ORAL DAILY
Qty: 30 TABLET | Refills: 3 | Status: SHIPPED | OUTPATIENT
Start: 2022-12-06

## 2022-12-06 NOTE — PROGRESS NOTES
"Chief Complaint  infected right middle finger, Urinary Frequency, and Anxiety    Subjective        Uriel Jarvis presents to [facility]  History of Present Illness  Patient appointment for chronic problem anxiety which she does not feel as adequately controlled with paroxetine long-term initially started 20 mg taper down to 2010 mg has not helped his anxiety.  He has frequent urination but no dysuria or urgency  He also has acute problem of nodule palmar aspect of middle finger right hand that he says has had a callus lesion for quite some time it started become more tender and reddened over the last week or 2 no specific trauma he he works in the [employer] industry  Objective   Vital Signs:  /72 (BP Location: Left arm, Patient Position: Sitting, Cuff Size: Adult)   Pulse 73   Temp 97.7 °F (36.5 °C) (Infrared)   Ht 182.9 cm (72\")   Wt 90.6 kg (199 lb 12.8 oz)   SpO2 100%   BMI 27.10 kg/m²   Estimated body mass index is 27.1 kg/m² as calculated from the following:    Height as of this encounter: 182.9 cm (72\").    Weight as of this encounter: 90.6 kg (199 lb 12.8 oz).          Physical Exam  Vitals and nursing note reviewed.   Constitutional:       Appearance: Normal appearance.   Cardiovascular:      Rate and Rhythm: Normal rate and regular rhythm.   Pulmonary:      Effort: Pulmonary effort is normal.   Musculoskeletal:        Arms:       Comments: Callused punctated lesion pared then minute serosanguineous pus expressed has couple associated satellite lesions not papules   Neurological:      Mental Status: He is alert.   Psychiatric:         Mood and Affect: Mood normal.         Behavior: Behavior normal.         Thought Content: Thought content normal.         Judgment: Judgment normal.        Result Review :      June 2019 CBC normal            Assessment and Plan   Diagnoses and all orders for this visit:    1. Cellulitis of right middle finger (Primary)  -     " cephalexin (Keflex) 500 MG capsule; Take 1 capsule by mouth 2 (Two) Times a Day.  Dispense: 20 capsule; Refill: 0    2. Urinary frequency  -     POCT urinalysis dipstick, automated    3. Anxiety  -     escitalopram (Lexapro) 10 MG tablet; Take 1 tablet by mouth Daily.  Dispense: 30 tablet; Refill: 3    Discontinue paroxetine  Finger cellulitis Epsom salt soaks 10 minutes 3 times a day for a week         Follow Up   Return in about 1 month (around 1/6/2023), or if symptoms worsen or fail to improve, for Recheck.  Patient was given instructions and counseling regarding his condition or for health maintenance advice. Please see specific information pulled into the AVS if appropriate.

## 2023-03-08 RX ORDER — PAROXETINE 10 MG/1
TABLET, FILM COATED ORAL
Qty: 15 TABLET | Refills: 0 | OUTPATIENT
Start: 2023-03-08

## 2023-05-16 DIAGNOSIS — F41.9 ANXIETY: ICD-10-CM

## 2023-05-18 RX ORDER — ESCITALOPRAM OXALATE 10 MG/1
10 TABLET ORAL DAILY
Qty: 30 TABLET | Refills: 3 | Status: SHIPPED | OUTPATIENT
Start: 2023-05-18

## 2023-06-13 DIAGNOSIS — F41.9 ANXIETY: ICD-10-CM

## 2023-06-13 RX ORDER — ESCITALOPRAM OXALATE 10 MG/1
TABLET ORAL
Qty: 30 TABLET | Refills: 0 | Status: SHIPPED | OUTPATIENT
Start: 2023-06-13

## 2023-09-25 DIAGNOSIS — F41.9 ANXIETY: ICD-10-CM

## 2023-09-25 RX ORDER — ESCITALOPRAM OXALATE 10 MG/1
10 TABLET ORAL DAILY
Qty: 90 TABLET | Refills: 1 | Status: SHIPPED | OUTPATIENT
Start: 2023-09-25

## 2024-01-12 DIAGNOSIS — F41.9 ANXIETY: ICD-10-CM

## 2024-01-12 RX ORDER — ESCITALOPRAM OXALATE 10 MG/1
10 TABLET ORAL DAILY
Qty: 10 TABLET | Refills: 0 | OUTPATIENT
Start: 2024-01-12

## 2024-01-12 NOTE — TELEPHONE ENCOUNTER
Rx Refill Note  Requested Prescriptions     Refused Prescriptions Disp Refills    escitalopram (LEXAPRO) 10 MG tablet 90 tablet 1     Sig: Take 1 tablet by mouth Daily.      Last office visit with prescribing clinician: 12/6/2022   Last telemedicine visit with prescribing clinician: Visit date not found   Next office visit with prescribing clinician: 1/22/2024                         Would you like a call back once the refill request has been completed: [] Yes [] No    If the office needs to give you a call back, can they leave a voicemail: [] Yes [] No    Patricia Weller MA  01/12/24, 14:12 EST

## 2024-02-12 DIAGNOSIS — F41.9 ANXIETY: ICD-10-CM

## 2024-02-15 RX ORDER — ESCITALOPRAM OXALATE 10 MG/1
10 TABLET ORAL DAILY
Qty: 30 TABLET | OUTPATIENT
Start: 2024-02-15

## 2024-03-05 DIAGNOSIS — F41.9 ANXIETY: ICD-10-CM

## 2024-03-05 RX ORDER — ESCITALOPRAM OXALATE 10 MG/1
10 TABLET ORAL DAILY
Qty: 90 TABLET | Refills: 1 | OUTPATIENT
Start: 2024-03-05

## 2024-03-08 DIAGNOSIS — F41.9 ANXIETY: ICD-10-CM

## 2024-03-08 NOTE — TELEPHONE ENCOUNTER
Caller: Matheus Uriel    Relationship: Self    Best call back number: [phone]    Requested Prescriptions:   Requested Prescriptions     Pending Prescriptions Disp Refills    escitalopram (LEXAPRO) 10 MG tablet 90 tablet 1     Sig: Take 1 tablet by mouth Daily.        Pharmacy where request should be sent: [facility] [id number] [facility] [id number] [facility] AT [facility] [phone] [facility] [phone]      Last office visit with prescribing clinician: 12/6/2022   Last telemedicine visit with prescribing clinician: Visit date not found   Next office visit with prescribing clinician: 3/18/2024     Additional details provided by patient: PATIENT IS OUT BUT HAS AN APPOINTMENT ON 3/18/24    Does the patient have less than a 3 day supply:  [] Yes  [x] No    Would you like a call back once the refill request has been completed: [] Yes [x] No    If the office needs to give you a call back, can they leave a voicemail: [] Yes [x] No    Maxi Van Rep   03/08/24 13:06 EST

## 2024-03-11 RX ORDER — ESCITALOPRAM OXALATE 10 MG/1
10 TABLET ORAL DAILY
Qty: 7 TABLET | Refills: 0 | Status: SHIPPED | OUTPATIENT
Start: 2024-03-11 | End: 2024-03-18 | Stop reason: SDUPTHER

## 2024-03-18 ENCOUNTER — OFFICE VISIT (OUTPATIENT)
Dept: INTERNAL MEDICINE | Facility: CLINIC | Age: 39
End: 2024-03-18
Payer: COMMERCIAL

## 2024-03-18 VITALS
WEIGHT: 218.2 LBS | HEART RATE: 74 BPM | TEMPERATURE: 97.9 F | BODY MASS INDEX: 29.55 KG/M2 | SYSTOLIC BLOOD PRESSURE: 138 MMHG | DIASTOLIC BLOOD PRESSURE: 74 MMHG | OXYGEN SATURATION: 98 % | HEIGHT: 72 IN

## 2024-03-18 DIAGNOSIS — F41.9 ANXIETY: Primary | ICD-10-CM

## 2024-03-18 DIAGNOSIS — J31.0 CHRONIC RHINITIS: ICD-10-CM

## 2024-03-18 DIAGNOSIS — Z00.00 HEALTHCARE MAINTENANCE: ICD-10-CM

## 2024-03-18 RX ORDER — ESCITALOPRAM OXALATE 20 MG/1
20 TABLET ORAL DAILY
Qty: 90 TABLET | Refills: 2 | Status: SHIPPED | OUTPATIENT
Start: 2024-03-18

## 2024-03-18 NOTE — PROGRESS NOTES
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    Weight as of this encounter: 99 kg (218 lb 3.2 oz).             Physical Exam  Vitals and nursing note reviewed.   Constitutional:       Appearance: Normal appearance.   HENT:      Head: Normocephalic and atraumatic.   Cardiovascular:      Rate and Rhythm: Normal rate.      Pulses: Normal pulses.   Pulmonary:      Effort: Pulmonary effort is normal.   Neurological:      Mental Status: He is alert.   Psychiatric:         Mood and Affect: Mood normal.         Behavior: Behavior normal.         Thought Content: Thought content normal.         Judgment: Judgment normal.        Result Review :                     Assessment and Plan     Diagnoses and all orders for this visit:    1. Anxiety (Primary)  -     escitalopram (LEXAPRO) 20 MG tablet; Take 1 tablet by mouth Daily.  Dispense: 90 tablet; Refill: 2    2. Healthcare maintenance  -     Lipid Panel; Future  -     Comprehensive Metabolic Panel; Future  -     TSH; Future  -     T4, Free; Future    3. Chronic rhinitis    Chronic rhinitis Flonase and Allegra daily  Anxiety increase dose of Lexapro to 20 mg    Fasting labs drawn Future follow-up results otherwise as " needed or benefit of increasing Lexapro       Follow Up     Return in about 1 month (around 4/18/2024), or if symptoms worsen or fail to improve, for Recheck.  Patient was given instructions and counseling regarding his condition or for health maintenance advice. Please see specific information pulled into the AVS if appropriate.

## 2024-09-28 NOTE — TELEPHONE ENCOUNTER
Rx Refill Note  Requested Prescriptions     Pending Prescriptions Disp Refills   • escitalopram (Lexapro) 10 MG tablet 30 tablet 3     Sig: Take 1 tablet by mouth Daily.      Last office visit with prescribing clinician: 12/6/2022   Last telemedicine visit with prescribing clinician: 3/7/2023   Next office visit with prescribing clinician: Visit date not found                         Would you like a call back once the refill request has been completed: [] Yes [] No    If the office needs to give you a call back, can they leave a voicemail: [] Yes [] No    Patricia Weller MA  05/18/23, 10:50 EDT  
4 = No assist / stand by assistance

## 2024-11-21 DIAGNOSIS — F41.9 ANXIETY: ICD-10-CM

## 2024-11-21 RX ORDER — ESCITALOPRAM OXALATE 20 MG/1
20 TABLET ORAL DAILY
Qty: 90 TABLET | Refills: 2 | Status: SHIPPED | OUTPATIENT
Start: 2024-11-21